# Patient Record
Sex: MALE | Race: WHITE | NOT HISPANIC OR LATINO | URBAN - METROPOLITAN AREA
[De-identification: names, ages, dates, MRNs, and addresses within clinical notes are randomized per-mention and may not be internally consistent; named-entity substitution may affect disease eponyms.]

---

## 2019-03-07 ENCOUNTER — OFFICE VISIT (OUTPATIENT)
Dept: INTERNAL MEDICINE | Facility: MEDICAL CENTER | Age: 24
End: 2019-03-07
Payer: COMMERCIAL

## 2019-03-07 VITALS
DIASTOLIC BLOOD PRESSURE: 67 MMHG | HEIGHT: 75 IN | HEART RATE: 86 BPM | OXYGEN SATURATION: 95 % | SYSTOLIC BLOOD PRESSURE: 131 MMHG | WEIGHT: 220.6 LBS | BODY MASS INDEX: 27.43 KG/M2 | TEMPERATURE: 98 F

## 2019-03-07 DIAGNOSIS — Z00.00 HEALTH CARE MAINTENANCE: ICD-10-CM

## 2019-03-07 DIAGNOSIS — F19.91 HISTORY OF ILLICIT DRUG USE: ICD-10-CM

## 2019-03-07 DIAGNOSIS — F17.200 SMOKING: ICD-10-CM

## 2019-03-07 PROCEDURE — 99203 OFFICE O/P NEW LOW 30 MIN: CPT | Mod: 25 | Performed by: STUDENT IN AN ORGANIZED HEALTH CARE EDUCATION/TRAINING PROGRAM

## 2019-03-07 PROCEDURE — 90686 IIV4 VACC NO PRSV 0.5 ML IM: CPT | Performed by: INTERNAL MEDICINE

## 2019-03-07 PROCEDURE — 90471 IMMUNIZATION ADMIN: CPT | Performed by: INTERNAL MEDICINE

## 2019-03-07 ASSESSMENT — ENCOUNTER SYMPTOMS
GASTROINTESTINAL NEGATIVE: 1
PSYCHIATRIC NEGATIVE: 1
CARDIOVASCULAR NEGATIVE: 1
RESPIRATORY NEGATIVE: 1
NEUROLOGICAL NEGATIVE: 1
EYES NEGATIVE: 1
MUSCULOSKELETAL NEGATIVE: 1
CONSTITUTIONAL NEGATIVE: 1

## 2019-03-07 ASSESSMENT — PAIN SCALES - GENERAL: PAINLEVEL: NO PAIN

## 2019-03-07 NOTE — NON-PROVIDER
"Shimon Sheppard is a 23 y.o. male here for a non-provider visit for:   FLU    Reason for immunization: Annual Flu Vaccine  Immunization records indicate need for vaccine: Yes, confirmed with Epic  Minimum interval has been met for this vaccine: Yes  ABN completed: Not Indicated    Order and dose verified by: Yesenia MCARTHUR Dated  08/07/15 was given to patient: Yes  All IAC Questionnaire questions were answered \"No.\"    Patient tolerated injection and no adverse effects were observed or reported: Yes    Pt scheduled for next dose in series: Not Indicated  "

## 2019-03-07 NOTE — LETTER
[facility]  Janel Viramontes M.D.  [address] [address]  Fax: [phone]   Authorization for Release/Disclosure of   Protected Health Information   Name: SHIMON SHEPPARD : 1995 SSN: [id number]   Address: [address] [place] [id number] Phone:    [phone] (home)    I authorize the entity listed below to release/disclose the PHI below to:   [facility]/Janel Viramontes M.D. and Janel Viramontes M.D.   Provider or Entity Name:     Address   City, State, Zip   Phone:      Fax:     Reason for request: continuity of care   Information to be released:    [  ] LAST COLONOSCOPY,  including any PATH REPORT and follow-up  [  ] LAST FIT/COLOGUARD RESULT [  ] LAST DEXA  [  ] LAST MAMMOGRAM  [  ] LAST PAP  [  ] LAST LABS [  ] RETINA EXAM REPORT  [  ] IMMUNIZATION RECORDS  [  ] Release all info      [  ] Check here and initial the line next to each item to release ALL health information INCLUDING  _____ Care and treatment for drug and / or alcohol abuse  _____ HIV testing, infection status, or AIDS  _____ Genetic Testing    DATES OF SERVICE OR TIME PERIOD TO BE DISCLOSED: _____________  I understand and acknowledge that:  * This Authorization may be revoked at any time by you in writing, except if your health information has already been used or disclosed.  * Your health information that will be used or disclosed as a result of you signing this authorization could be re-disclosed by the recipient. If this occurs, your re-disclosed health information may no longer be protected by State or Federal laws.  * You may refuse to sign this Authorization. Your refusal will not affect your ability to obtain treatment.  * This Authorization becomes effective upon signing and will  on (date) __________.      If no date is indicated, this Authorization will  one (1) year from the signature date.    Name: Shimon Sheppard    Signature:   Date:     3/7/2019       PLEASE FAX  REQUESTED RECORDS BACK TO: [phone]

## 2019-03-08 NOTE — PROGRESS NOTES
New Patient to Establish    Reason to establish: New patient to establish    CC: Asking for HPV vaccine.     HPI:     24 yo  Male patient with no significant PMH apart from being actively smoking and Hx of Heroine use last year. Patient present to clinic today asking for HPV vaccine as his partner recently diagnosed with HPV. He denies genital warts, lesions, discharges or ulcer. He has been sexually active with her only over the last 1 year. Patient received addiction rehab therapy at one of the [facility], he stayed for a 1 month ( 3/2018 - 4/2018) since then he has been off illicit drugs almost completed 1 year off illicit drugs. Currently works for [employer] and leads [employer].     Patient Active Problem List    Diagnosis Date Noted   • Smoking 03/07/2019   • History of illicit drug use 03/07/2019       History reviewed. No pertinent past medical history.    No current outpatient prescriptions on file.     No current facility-administered medications for this visit.        Allergies as of 03/07/2019   • (Not on File)       Social History     Social History   • Marital status: Single     Spouse name: N/A   • Number of children: N/A   • Years of education: N/A     Occupational History   • Not on file.     Social History Main Topics   • Smoking status: Current Some Day Smoker     Packs/day: 0.25   • Smokeless tobacco: Never Used   • Alcohol use 2.4 oz/week     4 Cans of beer per week   • Drug use: Yes     Types: Marijuana      Comment: OCC   • Sexual activity: Not on file     Other Topics Concern   • Not on file     Social History Narrative   • No narrative on file       History reviewed. No pertinent family history.    History reviewed. No pertinent surgical history.    ROS: As per HPI. Additional pertinent systems as noted below.    Constitutional: No fever or chills      Review of Systems   Constitutional: Negative.    HENT: Negative.    Eyes: Negative.    Respiratory: Negative.    Cardiovascular: Negative.   "  Gastrointestinal: Negative.    Genitourinary: Negative.    Musculoskeletal: Negative.    Skin: Negative.    Neurological: Negative.    Psychiatric/Behavioral: Negative.        /67 (BP Location: Left arm, Patient Position: Sitting)   Pulse 86   Temp 36.7 °C (98 °F) (Temporal)   Ht 1.893 m (6' 2.53\")   Wt 100.1 kg (220 lb 9.6 oz)   SpO2 95%   BMI 27.92 kg/m²     Physical Exam  General:  Alert and oriented, No apparent distress. Pleasant patient.   Eyes: Pupils equal and reactive. No scleral icterus.  Throat: Clear no erythema or exudates noted.  Neck: Supple. No lymphadenopathy noted. Thyroid not enlarged.  Lungs: Clear to auscultation and percussion bilaterally.  Cardiovascular: Regular rate and rhythm. No murmurs, rubs or gallops.  Abdomen:  Benign. No rebound or guarding noted.  Extremities: No clubbing, cyanosis, edema.No needle stick marks. Last use, 1 year ago    Skin: Clear. No rash or suspicious skin lesions noted      Assessment and Plan    1. Health care maintenance  - Influenza today 3/2019  - Tdap 3/2018  -Has been counseled on safe dricing   - HPV referred to his pharmacy   - PHQ2 negative   - CEM 7 scale negative  - CBC,CMP    2. Smoking  - Was smoking 1/2 PPD for the last 6 years, currently smokes 2-3 cig/day few days a week   - Motivated to quit smoking , refused NRT.   - Patient has been counseled on smoking cessations and health related complications.     3. History of illicit drug use  - Injected Heroine since the age of 16.   - Received Rehab Therapy at [facility]. 3/2018 - 4/2018  - Completed 1 year off illicit drugs.   - Records from this facility requested. ( HIV, Hep B, Hep C , ...)  - Patient hes been counseled on Illicit drug use and encouraged to continue off any.       Signed by: Janel Viramontes M.D.  "

## 2022-07-22 ENCOUNTER — TELEPHONE (OUTPATIENT)
Dept: SCHEDULING | Facility: IMAGING CENTER | Age: 27
End: 2022-07-22